# Patient Record
Sex: MALE | Race: WHITE | NOT HISPANIC OR LATINO | Employment: OTHER | URBAN - METROPOLITAN AREA
[De-identification: names, ages, dates, MRNs, and addresses within clinical notes are randomized per-mention and may not be internally consistent; named-entity substitution may affect disease eponyms.]

---

## 2017-02-15 ENCOUNTER — TRANSCRIBE ORDERS (OUTPATIENT)
Dept: SLEEP CENTER | Facility: CLINIC | Age: 82
End: 2017-02-15

## 2017-02-15 ENCOUNTER — HOSPITAL ENCOUNTER (OUTPATIENT)
Dept: SLEEP CENTER | Facility: CLINIC | Age: 82
Discharge: HOME | End: 2017-02-15
Payer: COMMERCIAL

## 2017-02-15 DIAGNOSIS — G47.33 OSA (OBSTRUCTIVE SLEEP APNEA): ICD-10-CM

## 2017-02-15 DIAGNOSIS — G47.33 OSA (OBSTRUCTIVE SLEEP APNEA): Primary | ICD-10-CM

## 2018-02-14 ENCOUNTER — OFFICE VISIT (OUTPATIENT)
Dept: SLEEP CENTER | Facility: CLINIC | Age: 83
End: 2018-02-14
Payer: COMMERCIAL

## 2018-02-14 VITALS
HEIGHT: 69 IN | BODY MASS INDEX: 31.96 KG/M2 | SYSTOLIC BLOOD PRESSURE: 92 MMHG | HEART RATE: 60 BPM | DIASTOLIC BLOOD PRESSURE: 58 MMHG | WEIGHT: 215.8 LBS

## 2018-02-14 DIAGNOSIS — G47.33 OSA (OBSTRUCTIVE SLEEP APNEA): ICD-10-CM

## 2018-02-14 PROCEDURE — 99213 OFFICE O/P EST LOW 20 MIN: CPT | Performed by: INTERNAL MEDICINE

## 2018-02-14 RX ORDER — TADALAFIL 20 MG/1
40 TABLET ORAL
COMMUNITY
Start: 2017-10-26

## 2018-02-14 RX ORDER — DILTIAZEM HYDROCHLORIDE 120 MG/1
120 CAPSULE, COATED, EXTENDED RELEASE ORAL
COMMUNITY
Start: 2015-09-21

## 2018-02-14 RX ORDER — FUROSEMIDE 20 MG/1
TABLET ORAL
COMMUNITY
Start: 2018-01-21

## 2018-02-14 RX ORDER — ATENOLOL 100 MG/1
TABLET ORAL
COMMUNITY
Start: 2018-01-10

## 2018-02-14 RX ORDER — CYANOCOBALAMIN (VITAMIN B-12) 500 MCG
TABLET ORAL
COMMUNITY

## 2018-02-14 RX ORDER — METOPROLOL TARTRATE 50 MG/1
TABLET, FILM COATED ORAL
COMMUNITY
Start: 2018-01-29

## 2018-02-14 RX ORDER — AMBRISENTAN 10 MG/1
10 TABLET, FILM COATED ORAL
COMMUNITY
Start: 2017-10-06 | End: 2018-10-06

## 2018-02-14 NOTE — PROGRESS NOTES
Progress Note - [address] [employer] QWE:[birth year] MRN: [id number]      Reason for Visit:  [de-identified] y o male here for annual follow-up    Assessment:  Doing well on current therapy of BPAP Auto with IPAP max 20 cm and EPAP min 6 cm with pressure support of 8 cm  for moderate obstructive sleep apnea (AHI = 23 in 2006)  The patient prefers the [employer] and needs a new prescription  Plan:  Continue same  The patient has been started on oxygen with exercise and during sleep by his pulmonologist at [facility]  It sounds as if he is being treated for pulmonary hypertension  I gave the patient the option of seeing his pulmonologist for follow-up of his obstructive sleep apnea, rather than seeing two different doctors  Follow up: One year    History of Present Illness:  History of DENISE on PAP therapy  Fully compliant and deriving benefit  Historical Information    Past Medical History: History reviewed  No pertinent past medical history  Past Surgical History: History reviewed  No pertinent surgical history  Social History - see chart  History   Alcohol use: Not on file     History   Drug Use Not on file     History   Smoking Status    Not on file   Smokeless Tobacco    Not on file     Family History: History reviewed  No pertinent family history      Medications/Allergies:      Current Outpatient Prescriptions:     ambrisentan (LETAIRIS) 10 MG tablet, Take 10 mg by mouth, Disp: , Rfl:     aspirin 81 MG tablet, Take 81 mg by mouth, Disp: , Rfl:     Calcium Carbonate-Vitamin D 500-50 MG-UNIT CAPS, Take one tab daily, Disp: , Rfl:     diltiazem (CARDIZEM CD) 120 mg 24 hr capsule, Take 120 mg by mouth, Disp: , Rfl:     Folic Acid 0 8 MG CAPS, Take by mouth, Disp: , Rfl:     furosemide (LASIX) 20 mg tablet, , Disp: , Rfl:     metoprolol tartrate (LOPRESSOR) 50 mg tablet, , Disp: , Rfl:     PRADAXA 150 MG capsu, , Disp: , Rfl:     Tadalafil, PAH, (ADCIRCA) 20 MG TABS, Take 40 mg by mouth, Disp: , Rfl:       Review of Systems      Genitourinary frequent urination and frequent urination at night   Cardiology none   Gastrointestinal none   Neurology muscle weakness, forgetfulness and confusion   Constitutional chills and weight change of 10 or more pounds in the past year gain of 15 pounds   Integumentary easy brusing   Psychiatry irritability   Musculoskeletal none   Pulmonary none   ENT nasal or sinus congestion, post nasal drip and decreased hearing   Endocrine excessive hunger and intolerance of cold   Hematological easy brusing         Objective      Vital Signs:   Vitals:    02/14/18 1000   BP: 92/58   Pulse: 60       [place]: 3      Physical Exam:    General: Alert, appropriate, cooperative, overweight    Head: NC/AT    Skin: Warm, dry    Neuro: No motor abnormalities, cranial nerves appear intact    Extremity: No clubbing, cyanosis    PAP setting:   As above  DME Provider:  [address] [place] [address] / Coordination of Care  Total clinic time spent today 15 minutes  Greater than 50% of total time was spent with the patient and / or family counseling and / or coordination of care  A description of the counseling / coordination of care: Discussed equipment and response to treatment  NENITA Kim    Board Certified Sleep Specialist